# Patient Record
Sex: FEMALE | URBAN - METROPOLITAN AREA
[De-identification: names, ages, dates, MRNs, and addresses within clinical notes are randomized per-mention and may not be internally consistent; named-entity substitution may affect disease eponyms.]

---

## 2017-04-26 ENCOUNTER — OUTPATIENT (OUTPATIENT)
Dept: OUTPATIENT SERVICES | Facility: HOSPITAL | Age: 27
LOS: 1 days | End: 2017-04-26
Payer: COMMERCIAL

## 2017-04-26 PROCEDURE — 99214 OFFICE O/P EST MOD 30 MIN: CPT

## 2017-05-01 ENCOUNTER — OUTPATIENT (OUTPATIENT)
Dept: OUTPATIENT SERVICES | Facility: HOSPITAL | Age: 27
LOS: 1 days | End: 2017-05-01
Payer: COMMERCIAL

## 2017-05-01 LAB
ALBUMIN SERPL ELPH-MCNC: 2.7 G/DL — LOW (ref 3.4–5)
ALP SERPL-CCNC: 188 U/L — HIGH (ref 40–120)
ALT FLD-CCNC: 26 U/L — SIGNIFICANT CHANGE UP (ref 12–42)
ANION GAP SERPL CALC-SCNC: 8 MMOL/L — LOW (ref 9–16)
APPEARANCE UR: CLEAR — SIGNIFICANT CHANGE UP
APTT BLD: 27.1 SEC — LOW (ref 27.5–37.4)
AST SERPL-CCNC: 27 U/L — SIGNIFICANT CHANGE UP (ref 15–37)
BASOPHILS NFR BLD AUTO: 0.1 % — SIGNIFICANT CHANGE UP (ref 0–2)
BILIRUB SERPL-MCNC: 0.3 MG/DL — SIGNIFICANT CHANGE UP (ref 0.2–1.2)
BILIRUB UR-MCNC: NEGATIVE — SIGNIFICANT CHANGE UP
BUN SERPL-MCNC: 8 MG/DL — SIGNIFICANT CHANGE UP (ref 7–23)
CALCIUM SERPL-MCNC: 9 MG/DL — SIGNIFICANT CHANGE UP (ref 8.5–10.5)
CHLORIDE SERPL-SCNC: 105 MMOL/L — SIGNIFICANT CHANGE UP (ref 96–108)
CO2 SERPL-SCNC: 24 MMOL/L — SIGNIFICANT CHANGE UP (ref 22–31)
COLOR SPEC: YELLOW — SIGNIFICANT CHANGE UP
CREAT SERPL-MCNC: 0.43 MG/DL — LOW (ref 0.5–1.3)
DIFF PNL FLD: (no result)
EOSINOPHIL NFR BLD AUTO: 0.2 % — SIGNIFICANT CHANGE UP (ref 0–6)
FIBRINOGEN PPP-MCNC: 577 MG/DL — HIGH (ref 258–438)
GLUCOSE SERPL-MCNC: 104 MG/DL — HIGH (ref 70–99)
GLUCOSE UR QL: NEGATIVE — SIGNIFICANT CHANGE UP
HCT VFR BLD CALC: 33.1 % — LOW (ref 34.5–45)
HGB BLD-MCNC: 11.4 G/DL — LOW (ref 11.5–15.5)
INR BLD: 0.92 — SIGNIFICANT CHANGE UP (ref 0.88–1.16)
KETONES UR-MCNC: NEGATIVE — SIGNIFICANT CHANGE UP
LDH SERPL L TO P-CCNC: 260 U/L — HIGH (ref 84–246)
LEUKOCYTE ESTERASE UR-ACNC: (no result)
LYMPHOCYTES # BLD AUTO: 12.7 % — LOW (ref 13–44)
MCHC RBC-ENTMCNC: 27.2 PG — SIGNIFICANT CHANGE UP (ref 27–34)
MCHC RBC-ENTMCNC: 34.4 G/DL — SIGNIFICANT CHANGE UP (ref 32–36)
MCV RBC AUTO: 79 FL — LOW (ref 80–100)
MONOCYTES NFR BLD AUTO: 7.8 % — SIGNIFICANT CHANGE UP (ref 2–14)
NEUTROPHILS NFR BLD AUTO: 79.2 % — HIGH (ref 43–77)
NITRITE UR-MCNC: NEGATIVE — SIGNIFICANT CHANGE UP
PH UR: 6.5 — SIGNIFICANT CHANGE UP (ref 5–8)
PLATELET # BLD AUTO: 257 K/UL — SIGNIFICANT CHANGE UP (ref 150–400)
POTASSIUM SERPL-MCNC: 3.6 MMOL/L — SIGNIFICANT CHANGE UP (ref 3.5–5.3)
POTASSIUM SERPL-SCNC: 3.6 MMOL/L — SIGNIFICANT CHANGE UP (ref 3.5–5.3)
PROT SERPL-MCNC: 6.9 G/DL — SIGNIFICANT CHANGE UP (ref 6.4–8.2)
PROT UR-MCNC: NEGATIVE MG/DL — SIGNIFICANT CHANGE UP
PROTHROM AB SERPL-ACNC: 10.2 SEC — SIGNIFICANT CHANGE UP (ref 9.8–12.7)
RBC # BLD: 4.19 M/UL — SIGNIFICANT CHANGE UP (ref 3.8–5.2)
RBC # FLD: 16.6 % — SIGNIFICANT CHANGE UP (ref 10.3–16.9)
SODIUM SERPL-SCNC: 137 MMOL/L — SIGNIFICANT CHANGE UP (ref 135–145)
SP GR SPEC: <=1.005 — SIGNIFICANT CHANGE UP (ref 1–1.03)
URATE SERPL-MCNC: 4 MG/DL — SIGNIFICANT CHANGE UP (ref 2.6–6)
UROBILINOGEN FLD QL: 0.2 E.U./DL — SIGNIFICANT CHANGE UP
WBC # BLD: 8.2 K/UL — SIGNIFICANT CHANGE UP (ref 3.8–10.5)
WBC # FLD AUTO: 8.2 K/UL — SIGNIFICANT CHANGE UP (ref 3.8–10.5)

## 2017-05-01 PROCEDURE — 85025 COMPLETE CBC W/AUTO DIFF WBC: CPT

## 2017-05-01 PROCEDURE — 85610 PROTHROMBIN TIME: CPT

## 2017-05-01 PROCEDURE — 84550 ASSAY OF BLOOD/URIC ACID: CPT

## 2017-05-01 PROCEDURE — 85384 FIBRINOGEN ACTIVITY: CPT

## 2017-05-01 PROCEDURE — 36415 COLL VENOUS BLD VENIPUNCTURE: CPT

## 2017-05-01 PROCEDURE — 81001 URINALYSIS AUTO W/SCOPE: CPT

## 2017-05-01 PROCEDURE — 83615 LACTATE (LD) (LDH) ENZYME: CPT

## 2017-05-01 PROCEDURE — 80053 COMPREHEN METABOLIC PANEL: CPT

## 2017-05-01 PROCEDURE — 85730 THROMBOPLASTIN TIME PARTIAL: CPT

## 2017-05-01 PROCEDURE — 99214 OFFICE O/P EST MOD 30 MIN: CPT

## 2017-05-03 DIAGNOSIS — O09.613 SUPERVISION OF YOUNG PRIMIGRAVIDA, THIRD TRIMESTER: ICD-10-CM

## 2017-05-05 ENCOUNTER — INPATIENT (INPATIENT)
Facility: HOSPITAL | Age: 27
LOS: 2 days | Discharge: HOME | End: 2017-05-08
Attending: OBSTETRICS & GYNECOLOGY | Admitting: OBSTETRICS & GYNECOLOGY
Payer: COMMERCIAL

## 2017-05-05 VITALS — WEIGHT: 169.76 LBS | HEIGHT: 67 IN

## 2017-05-05 DIAGNOSIS — O26.899 OTHER SPECIFIED PREGNANCY RELATED CONDITIONS, UNSPECIFIED TRIMESTER: ICD-10-CM

## 2017-05-05 DIAGNOSIS — Z3A.00 WEEKS OF GESTATION OF PREGNANCY NOT SPECIFIED: ICD-10-CM

## 2017-05-05 LAB
ALBUMIN SERPL ELPH-MCNC: 2.9 G/DL — LOW (ref 3.4–5)
ALP SERPL-CCNC: 198 U/L — HIGH (ref 40–120)
ALT FLD-CCNC: 28 U/L — SIGNIFICANT CHANGE UP (ref 12–42)
ANION GAP SERPL CALC-SCNC: 11 MMOL/L — SIGNIFICANT CHANGE UP (ref 9–16)
APPEARANCE UR: CLEAR — SIGNIFICANT CHANGE UP
APTT BLD: 27.2 SEC — LOW (ref 27.5–37.4)
AST SERPL-CCNC: 30 U/L — SIGNIFICANT CHANGE UP (ref 15–37)
BASOPHILS NFR BLD AUTO: 0.1 % — SIGNIFICANT CHANGE UP (ref 0–2)
BILIRUB SERPL-MCNC: 0.3 MG/DL — SIGNIFICANT CHANGE UP (ref 0.2–1.2)
BILIRUB UR-MCNC: NEGATIVE — SIGNIFICANT CHANGE UP
BLD GP AB SCN SERPL QL: NEGATIVE — SIGNIFICANT CHANGE UP
BLD GP AB SCN SERPL QL: NEGATIVE — SIGNIFICANT CHANGE UP
BUN SERPL-MCNC: 6 MG/DL — LOW (ref 7–23)
CALCIUM SERPL-MCNC: 9.1 MG/DL — SIGNIFICANT CHANGE UP (ref 8.5–10.5)
CHLORIDE SERPL-SCNC: 103 MMOL/L — SIGNIFICANT CHANGE UP (ref 96–108)
CO2 SERPL-SCNC: 22 MMOL/L — SIGNIFICANT CHANGE UP (ref 22–31)
COLOR SPEC: YELLOW — SIGNIFICANT CHANGE UP
CREAT SERPL-MCNC: 0.43 MG/DL — LOW (ref 0.5–1.3)
DIFF PNL FLD: (no result)
EOSINOPHIL NFR BLD AUTO: 0.2 % — SIGNIFICANT CHANGE UP (ref 0–6)
FIBRINOGEN PPP-MCNC: 536 MG/DL — HIGH (ref 258–438)
GLUCOSE SERPL-MCNC: 71 MG/DL — SIGNIFICANT CHANGE UP (ref 70–99)
GLUCOSE UR QL: NEGATIVE — SIGNIFICANT CHANGE UP
HCT VFR BLD CALC: 35 % — SIGNIFICANT CHANGE UP (ref 34.5–45)
HGB BLD-MCNC: 12 G/DL — SIGNIFICANT CHANGE UP (ref 11.5–15.5)
INR BLD: 0.91 — SIGNIFICANT CHANGE UP (ref 0.88–1.16)
KETONES UR-MCNC: (no result) MG/DL
LDH SERPL L TO P-CCNC: 250 U/L — HIGH (ref 84–246)
LEUKOCYTE ESTERASE UR-ACNC: (no result)
LYMPHOCYTES # BLD AUTO: 16.6 % — SIGNIFICANT CHANGE UP (ref 13–44)
MCHC RBC-ENTMCNC: 27.3 PG — SIGNIFICANT CHANGE UP (ref 27–34)
MCHC RBC-ENTMCNC: 34.3 G/DL — SIGNIFICANT CHANGE UP (ref 32–36)
MCV RBC AUTO: 79.5 FL — LOW (ref 80–100)
MONOCYTES NFR BLD AUTO: 7.1 % — SIGNIFICANT CHANGE UP (ref 2–14)
NEUTROPHILS NFR BLD AUTO: 76 % — SIGNIFICANT CHANGE UP (ref 43–77)
NITRITE UR-MCNC: NEGATIVE — SIGNIFICANT CHANGE UP
PH UR: 7 — SIGNIFICANT CHANGE UP (ref 5–8)
PLATELET # BLD AUTO: 245 K/UL — SIGNIFICANT CHANGE UP (ref 150–400)
POTASSIUM SERPL-MCNC: 3.8 MMOL/L — SIGNIFICANT CHANGE UP (ref 3.5–5.3)
POTASSIUM SERPL-SCNC: 3.8 MMOL/L — SIGNIFICANT CHANGE UP (ref 3.5–5.3)
PROT SERPL-MCNC: 6.9 G/DL — SIGNIFICANT CHANGE UP (ref 6.4–8.2)
PROT UR-MCNC: NEGATIVE MG/DL — SIGNIFICANT CHANGE UP
PROTHROM AB SERPL-ACNC: 10.1 SEC — SIGNIFICANT CHANGE UP (ref 9.8–12.7)
RBC # BLD: 4.4 M/UL — SIGNIFICANT CHANGE UP (ref 3.8–5.2)
RBC # FLD: 17.5 % — HIGH (ref 10.3–16.9)
RH IG SCN BLD-IMP: POSITIVE — SIGNIFICANT CHANGE UP
RH IG SCN BLD-IMP: POSITIVE — SIGNIFICANT CHANGE UP
SODIUM SERPL-SCNC: 136 MMOL/L — SIGNIFICANT CHANGE UP (ref 135–145)
SP GR SPEC: 1.01 — SIGNIFICANT CHANGE UP (ref 1–1.03)
URATE SERPL-MCNC: 4.1 MG/DL — SIGNIFICANT CHANGE UP (ref 2.6–6)
UROBILINOGEN FLD QL: 0.2 E.U./DL — SIGNIFICANT CHANGE UP
WBC # BLD: 8.1 K/UL — SIGNIFICANT CHANGE UP (ref 3.8–10.5)
WBC # FLD AUTO: 8.1 K/UL — SIGNIFICANT CHANGE UP (ref 3.8–10.5)

## 2017-05-05 RX ORDER — SODIUM CHLORIDE 9 MG/ML
1000 INJECTION, SOLUTION INTRAVENOUS ONCE
Refills: 0 | Status: DISCONTINUED | OUTPATIENT
Start: 2017-05-05 | End: 2017-05-06

## 2017-05-05 RX ORDER — OXYTOCIN 10 UNIT/ML
1 VIAL (ML) INJECTION
Qty: 30 | Refills: 0 | Status: DISCONTINUED | OUTPATIENT
Start: 2017-05-05 | End: 2017-05-06

## 2017-05-05 RX ORDER — OXYTOCIN 10 UNIT/ML
333.33 VIAL (ML) INJECTION
Qty: 20 | Refills: 0 | Status: DISCONTINUED | OUTPATIENT
Start: 2017-05-05 | End: 2017-05-06

## 2017-05-05 RX ORDER — CITRIC ACID/SODIUM CITRATE 300-500 MG
15 SOLUTION, ORAL ORAL EVERY 4 HOURS
Refills: 0 | Status: DISCONTINUED | OUTPATIENT
Start: 2017-05-05 | End: 2017-05-06

## 2017-05-05 RX ORDER — INFLUENZA VIRUS VACCINE 15; 15; 15; 15 UG/.5ML; UG/.5ML; UG/.5ML; UG/.5ML
0.5 SUSPENSION INTRAMUSCULAR ONCE
Refills: 0 | Status: DISCONTINUED | OUTPATIENT
Start: 2017-05-05 | End: 2017-05-06

## 2017-05-05 RX ORDER — SODIUM CHLORIDE 9 MG/ML
1000 INJECTION, SOLUTION INTRAVENOUS
Refills: 0 | Status: DISCONTINUED | OUTPATIENT
Start: 2017-05-05 | End: 2017-05-06

## 2017-05-05 RX ADMIN — SODIUM CHLORIDE 125 MILLILITER(S): 9 INJECTION, SOLUTION INTRAVENOUS at 18:10

## 2017-05-05 RX ADMIN — Medication 1 MILLIUNIT(S)/MIN: at 19:31

## 2017-05-06 LAB
APTT BLD: 25.8 SEC — LOW (ref 27.5–37.4)
FIBRINOGEN PPP-MCNC: 375 MG/DL — SIGNIFICANT CHANGE UP (ref 258–438)
HCT VFR BLD CALC: 27.3 % — LOW (ref 34.5–45)
HGB BLD-MCNC: 9.2 G/DL — LOW (ref 11.5–15.5)
INR BLD: 0.88 — SIGNIFICANT CHANGE UP (ref 0.88–1.16)
MCHC RBC-ENTMCNC: 26.9 PG — LOW (ref 27–34)
MCHC RBC-ENTMCNC: 33.7 G/DL — SIGNIFICANT CHANGE UP (ref 32–36)
MCV RBC AUTO: 79.8 FL — LOW (ref 80–100)
PLATELET # BLD AUTO: 196 K/UL — SIGNIFICANT CHANGE UP (ref 150–400)
PROTHROM AB SERPL-ACNC: 9.7 SEC — LOW (ref 9.8–12.7)
RBC # BLD: 3.42 M/UL — LOW (ref 3.8–5.2)
RBC # FLD: 17.6 % — HIGH (ref 10.3–16.9)
WBC # BLD: 8.6 K/UL — SIGNIFICANT CHANGE UP (ref 3.8–10.5)
WBC # FLD AUTO: 8.6 K/UL — SIGNIFICANT CHANGE UP (ref 3.8–10.5)

## 2017-05-06 RX ORDER — ACETAMINOPHEN 500 MG
650 TABLET ORAL EVERY 6 HOURS
Refills: 0 | Status: DISCONTINUED | OUTPATIENT
Start: 2017-05-06 | End: 2017-05-08

## 2017-05-06 RX ORDER — PRAMOXINE HYDROCHLORIDE 150 MG/15G
1 AEROSOL, FOAM RECTAL EVERY 4 HOURS
Refills: 0 | Status: DISCONTINUED | OUTPATIENT
Start: 2017-05-06 | End: 2017-05-08

## 2017-05-06 RX ORDER — TETANUS TOXOID, REDUCED DIPHTHERIA TOXOID AND ACELLULAR PERTUSSIS VACCINE, ADSORBED 5; 2.5; 8; 8; 2.5 [IU]/.5ML; [IU]/.5ML; UG/.5ML; UG/.5ML; UG/.5ML
0.5 SUSPENSION INTRAMUSCULAR ONCE
Refills: 0 | Status: DISCONTINUED | OUTPATIENT
Start: 2017-05-06 | End: 2017-05-08

## 2017-05-06 RX ORDER — GLYCERIN ADULT
1 SUPPOSITORY, RECTAL RECTAL AT BEDTIME
Refills: 0 | Status: DISCONTINUED | OUTPATIENT
Start: 2017-05-06 | End: 2017-05-08

## 2017-05-06 RX ORDER — DIBUCAINE 1 %
1 OINTMENT (GRAM) RECTAL EVERY 4 HOURS
Refills: 0 | Status: DISCONTINUED | OUTPATIENT
Start: 2017-05-06 | End: 2017-05-08

## 2017-05-06 RX ORDER — HYDROCORTISONE 1 %
1 OINTMENT (GRAM) TOPICAL EVERY 4 HOURS
Refills: 0 | Status: DISCONTINUED | OUTPATIENT
Start: 2017-05-06 | End: 2017-05-08

## 2017-05-06 RX ORDER — DOCUSATE SODIUM 100 MG
100 CAPSULE ORAL
Refills: 0 | Status: DISCONTINUED | OUTPATIENT
Start: 2017-05-06 | End: 2017-05-08

## 2017-05-06 RX ORDER — BENZOCAINE 10 %
1 GEL (GRAM) MUCOUS MEMBRANE EVERY 6 HOURS
Refills: 0 | Status: DISCONTINUED | OUTPATIENT
Start: 2017-05-06 | End: 2017-05-08

## 2017-05-06 RX ORDER — OXYTOCIN 10 UNIT/ML
41.67 VIAL (ML) INJECTION
Qty: 20 | Refills: 0 | Status: DISCONTINUED | OUTPATIENT
Start: 2017-05-06 | End: 2017-05-08

## 2017-05-06 RX ORDER — IBUPROFEN 200 MG
600 TABLET ORAL EVERY 6 HOURS
Refills: 0 | Status: DISCONTINUED | OUTPATIENT
Start: 2017-05-06 | End: 2017-05-08

## 2017-05-06 RX ORDER — SODIUM CHLORIDE 9 MG/ML
3 INJECTION INTRAMUSCULAR; INTRAVENOUS; SUBCUTANEOUS EVERY 8 HOURS
Refills: 0 | Status: DISCONTINUED | OUTPATIENT
Start: 2017-05-06 | End: 2017-05-08

## 2017-05-06 RX ORDER — MAGNESIUM HYDROXIDE 400 MG/1
30 TABLET, CHEWABLE ORAL
Refills: 0 | Status: DISCONTINUED | OUTPATIENT
Start: 2017-05-06 | End: 2017-05-08

## 2017-05-06 RX ORDER — AER TRAVELER 0.5 G/1
1 SOLUTION RECTAL; TOPICAL EVERY 4 HOURS
Refills: 0 | Status: DISCONTINUED | OUTPATIENT
Start: 2017-05-06 | End: 2017-05-08

## 2017-05-06 RX ORDER — SIMETHICONE 80 MG/1
80 TABLET, CHEWABLE ORAL EVERY 6 HOURS
Refills: 0 | Status: DISCONTINUED | OUTPATIENT
Start: 2017-05-06 | End: 2017-05-08

## 2017-05-06 RX ORDER — DIPHENHYDRAMINE HCL 50 MG
25 CAPSULE ORAL EVERY 6 HOURS
Refills: 0 | Status: DISCONTINUED | OUTPATIENT
Start: 2017-05-06 | End: 2017-05-08

## 2017-05-06 RX ORDER — LANOLIN
1 OINTMENT (GRAM) TOPICAL EVERY 6 HOURS
Refills: 0 | Status: DISCONTINUED | OUTPATIENT
Start: 2017-05-06 | End: 2017-05-08

## 2017-05-06 RX ADMIN — Medication 600 MILLIGRAM(S): at 20:41

## 2017-05-06 RX ADMIN — Medication 600 MILLIGRAM(S): at 10:22

## 2017-05-06 RX ADMIN — Medication 1 TABLET(S): at 12:13

## 2017-05-06 RX ADMIN — SODIUM CHLORIDE 3 MILLILITER(S): 9 INJECTION INTRAMUSCULAR; INTRAVENOUS; SUBCUTANEOUS at 18:15

## 2017-05-06 RX ADMIN — Medication 600 MILLIGRAM(S): at 23:04

## 2017-05-06 RX ADMIN — Medication 125 MILLIUNIT(S)/MIN: at 18:16

## 2017-05-06 RX ADMIN — Medication 600 MILLIGRAM(S): at 22:23

## 2017-05-06 RX ADMIN — Medication 125 MILLIUNIT(S)/MIN: at 04:53

## 2017-05-06 RX ADMIN — Medication 100 MILLIGRAM(S): at 12:13

## 2017-05-06 RX ADMIN — Medication 600 MILLIGRAM(S): at 11:22

## 2017-05-06 RX ADMIN — Medication 600 MILLIGRAM(S): at 18:13

## 2017-05-06 RX ADMIN — SODIUM CHLORIDE 3 MILLILITER(S): 9 INJECTION INTRAMUSCULAR; INTRAVENOUS; SUBCUTANEOUS at 22:24

## 2017-05-07 LAB — T PALLIDUM AB TITR SER: NEGATIVE — SIGNIFICANT CHANGE UP

## 2017-05-07 RX ADMIN — Medication 600 MILLIGRAM(S): at 10:19

## 2017-05-07 RX ADMIN — SODIUM CHLORIDE 3 MILLILITER(S): 9 INJECTION INTRAMUSCULAR; INTRAVENOUS; SUBCUTANEOUS at 09:20

## 2017-05-07 RX ADMIN — Medication 100 MILLIGRAM(S): at 12:59

## 2017-05-07 RX ADMIN — SODIUM CHLORIDE 3 MILLILITER(S): 9 INJECTION INTRAMUSCULAR; INTRAVENOUS; SUBCUTANEOUS at 23:18

## 2017-05-07 RX ADMIN — Medication 1 TABLET(S): at 12:59

## 2017-05-07 RX ADMIN — Medication 600 MILLIGRAM(S): at 17:56

## 2017-05-07 RX ADMIN — Medication 600 MILLIGRAM(S): at 23:20

## 2017-05-07 RX ADMIN — Medication 600 MILLIGRAM(S): at 18:25

## 2017-05-07 RX ADMIN — Medication 600 MILLIGRAM(S): at 09:20

## 2017-05-07 RX ADMIN — SODIUM CHLORIDE 3 MILLILITER(S): 9 INJECTION INTRAMUSCULAR; INTRAVENOUS; SUBCUTANEOUS at 06:27

## 2017-05-07 NOTE — DISCHARGE NOTE OB - PATIENT PORTAL LINK FT
“You can access the [facility] Patient Portal, offered by Health system, by registering with the following website: [email or web address]”

## 2017-05-07 NOTE — DISCHARGE NOTE OB - CARE PROVIDER_API CALL
Gabrielle Kaur (), Obstetrics and Gynecology  [address] [place] [id number]  Phone: [phone]  Fax: [phone]

## 2017-05-07 NOTE — DISCHARGE NOTE OB - CARE PLAN
Principal Discharge DX:	Postpartum examination following vaginal delivery  Goal:	postpartum  Instructions for follow-up, activity and diet:	pelvic rest, no heavy lifting, follow up in the office in 2 weeks

## 2017-05-08 VITALS
TEMPERATURE: 98 F | RESPIRATION RATE: 16 BRPM | SYSTOLIC BLOOD PRESSURE: 115 MMHG | HEART RATE: 82 BPM | DIASTOLIC BLOOD PRESSURE: 69 MMHG | OXYGEN SATURATION: 100 %

## 2017-05-08 PROCEDURE — 84550 ASSAY OF BLOOD/URIC ACID: CPT

## 2017-05-08 PROCEDURE — 83615 LACTATE (LD) (LDH) ENZYME: CPT

## 2017-05-08 PROCEDURE — 86900 BLOOD TYPING SEROLOGIC ABO: CPT

## 2017-05-08 PROCEDURE — 86850 RBC ANTIBODY SCREEN: CPT

## 2017-05-08 PROCEDURE — 85025 COMPLETE CBC W/AUTO DIFF WBC: CPT

## 2017-05-08 PROCEDURE — 81001 URINALYSIS AUTO W/SCOPE: CPT

## 2017-05-08 PROCEDURE — 88307 TISSUE EXAM BY PATHOLOGIST: CPT

## 2017-05-08 PROCEDURE — 85730 THROMBOPLASTIN TIME PARTIAL: CPT

## 2017-05-08 PROCEDURE — 85610 PROTHROMBIN TIME: CPT

## 2017-05-08 PROCEDURE — 85027 COMPLETE CBC AUTOMATED: CPT

## 2017-05-08 PROCEDURE — 80053 COMPREHEN METABOLIC PANEL: CPT

## 2017-05-08 PROCEDURE — 36415 COLL VENOUS BLD VENIPUNCTURE: CPT

## 2017-05-08 PROCEDURE — 86780 TREPONEMA PALLIDUM: CPT

## 2017-05-08 PROCEDURE — 86901 BLOOD TYPING SEROLOGIC RH(D): CPT

## 2017-05-08 PROCEDURE — 85384 FIBRINOGEN ACTIVITY: CPT

## 2017-05-08 RX ADMIN — Medication 600 MILLIGRAM(S): at 07:30

## 2017-05-08 RX ADMIN — Medication 600 MILLIGRAM(S): at 14:30

## 2017-05-08 RX ADMIN — Medication 1 TABLET(S): at 12:44

## 2017-05-08 RX ADMIN — SODIUM CHLORIDE 3 MILLILITER(S): 9 INJECTION INTRAMUSCULAR; INTRAVENOUS; SUBCUTANEOUS at 06:34

## 2017-05-08 RX ADMIN — Medication 600 MILLIGRAM(S): at 06:38

## 2017-05-08 RX ADMIN — Medication 600 MILLIGRAM(S): at 13:24

## 2017-05-08 RX ADMIN — Medication 600 MILLIGRAM(S): at 00:06

## 2017-05-08 NOTE — PROGRESS NOTE ADULT - ASSESSMENT
A/P yo 26y s/p , PPD #2, c/b PPH, stable  1. PPH: patient is asymptomatic, vitals stable,  lochia is minimal, hgb stable at 9.2, s/p cytotec 1000 mcg ME and an additional bag of pitocin. Continue to monitor lochia and VS.   2. Pain: Motrin prn  3. GI: Reg diet  4. Encourage breastfeeding  5. DVT prophylaxis: early ambulation  6. Dispo: PP2
25yo s/p vaginal delivery, now complicated by PPH. Pt s/p Cytotec 1000mcg per rectum and is currently on pitocin. Uterus firm with no active vaginal bleeding. Pt asymptomatic. Discussed with Dr. Kaur. No further CBC as of now unless clinically indicated. Continue to monitor.
A/P yo 26y s/p , PPD #1, c/b PPH, stable  1. PPH: patient is asymptomatic, lochia is minimal this morning, hgb stable at 9.2, s/p cytotec 1000 mcg DE and an additional bag of pitocin. Continue to monitor lochia and VS.   2. Pain: Motrin prn  3. GI: Reg diet  4. Encourage breastfeeding  5. DVT prophylaxis: early ambulation  6. Dispo: PP2

## 2017-05-08 NOTE — PROGRESS NOTE ADULT - SUBJECTIVE AND OBJECTIVE BOX
Patient seen and evaluated. No complaints. Minimal bleeding  ICU Vital Signs Last 24 Hrs  T(C): 36.4, Max: 37.6 (05-06 @ 20:06)  T(F): 97.6, Max: 99.7 (05-06 @ 20:06)  HR: 96 (74 - 124)  BP: 114/71 (103/62 - 140/85)  BP(mean): 91 (91 - 91)  ABP: --  ABP(mean): --  RR: 17 (16 - 18)  SpO2: 100% (96% - 100%)    Abd uterus firm, below the umbilicus  Ext no calf tenderness  Lochia minimal    Plan PP day 1, s/p episode of heavy bleeding yesterday  regular diet  ambulation encouraged  monitor bleeding
Patient evaluated at bedside.   She reports pain is well controlled with OPM.  She has been ambulating without assistance, voiding spontaneously, passing gas, tolerating regular diet and is breastfeeding.    She denies HA, dizziness, CP, palpitations, SOB, n/v, or heavy vaginal bleeding.    Physical Exam:  vss  Gen: NAD  Abd: + BS, soft, nontender, nondistended, no rebound or guarding  uterus firm at midline,   fb below umbilicus  : lochia WNL  Extremities: no swelling or calf tenderness    ppd2 stable  plan to dc
Patient evaluated at bedside.   She reports pain is well controlled with motrin.  She denies dizziness, chest pain, palpitations, shortness of breathe, nausea, vomiting, heavy vaginal bleeding or perineal discomfort.  She has been ambulating without assistance, voiding spontaneously, and is breastfeeding.    Physical Exam:  Vital Signs Last 24 Hrs  T(C): 36.7, Max: 37.6 (05-06 @ 20:06)  T(F): 98.1, Max: 99.7 (05-06 @ 20:06)  HR: 88 (69 - 124)  BP: 119/76 (103/62 - 140/85)  BP(mean): 91 (91 - 91)  RR: 17 (16 - 18)  SpO2: 100% (96% - 100%)    GA: NAD, A+0 x 3  CV: RRR  Pulm: CTAB  Breasts: soft, nontender, no palpable masses  Abd: + BS, soft, nontender, nondistended, no rebound or guarding, uterus firm at midline,  2 fb below umbilicus  : lochia minimal  Extremities: no swelling or calf tenderness, reflexes +2 bilaterally                          9.2    8.6   )-----------( 196      ( 06 May 2017 16:53 )             27.3     05-05    136  |  103  |  6<L>  ----------------------------<  71  3.8   |  22  |  0.43<L>    Ca    9.1      05 May 2017 18:36    TPro  6.9  /  Alb  2.9<L>  /  TBili  0.3  /  DBili  x   /  AST  30  /  ALT  28  /  AlkPhos  198<H>  05-05    PT/INR - ( 06 May 2017 16:53 )   PT: 9.7 sec;   INR: 0.88          PTT - ( 06 May 2017 16:53 )  PTT:25.8 sec
Patient re-evaluated at bedside. She notes that she is feeling well. Denies heavy vaginal bleeding or clots. Noted a small amount of bleeding earlier. Denies F/C, N/V, CP, SOB, Abd pain.      Physical Exam:  Vital Signs Last 24 Hrs  T(C): 36.7, Max: 37.1 (05-06 @ 06:05)  T(F): 98.1, Max: 98.8 (05-06 @ 06:05)  HR: 92 (69 - 121)  BP: 114/77 (105/69 - 140/85)  BP(mean): --  RR: 18 (18 - 20)  SpO2: 98% (96% - 100%)    GA: NAD, A+0 x 3  Abd: + BS, soft, nontender, nondistended, no rebound or guarding uterus firm at midline, 2 fb below umbilicus  : lochia WNL  Extremities: no swelling or calf tenderness                            9.2    8.6   )-----------( 196      ( 06 May 2017 16:53 )             27.3     05-05    136  |  103  |  6<L>  ----------------------------<  71  3.8   |  22  |  0.43<L>    Ca    9.1      05 May 2017 18:36    TPro  6.9  /  Alb  2.9<L>  /  TBili  0.3  /  DBili  x   /  AST  30  /  ALT  28  /  AlkPhos  198<H>  05-05      PT/INR - ( 06 May 2017 16:53 )   PT: 9.7 sec;   INR: 0.88          PTT - ( 06 May 2017 16:53 )  PTT:25.8 sec
Patient seen at bedside for vaginal bleeding. Pt denies light headedness/dizziness or SOB. Upon evaluation, underwear and pad was soaked through. Bimanual exam performed. 600cc of dark clots expressed. fundus of uterus feels firm and at midline below the umbilicus. 1000mcg [place] cytotec given. blood pressure remains tstable at 120/70. HR 100s-110s. Pt reports she is very anxious. STAT CBC and Coags ordered. Attending made aware    Plan:   -f/u vital signs  -f/u CBC and coags  -Patient instructed to remain in bed and to notify nurse of any symptoms of lightheadedness, dizziness, or SOB.
Patient evaluated at bedside.   She reports pain is well controlled with motrin. Lochia is minimal. She passed a small clot yesterday, but otherwise bleeding has been lighter than menses.  She denies headache, dizziness, chest pain, palpitations, shortness of breathe, nausea, vomiting, perineal discomfort.  She has been ambulating without assistance, voiding spontaneously, and is breastfeeding.    Physical Exam:  Vital Signs Last 24 Hrs  T(C): 36.8, Max: 36.8 (05-07 @ 09:10)  T(F): 98.2, Max: 98.2 (05-07 @ 09:10)  HR: 90 (78 - 96)  BP: 114/72 (113/79 - 129/86)  BP(mean): --  RR: 18 (17 - 18)  SpO2: 100% (100% - 100%)    GA: NAD, A+0 x 3  CV: RRR  Pulm: CTAB  Breasts: soft, nontender, no palpable masses  Abd: + BS, soft, nontender, nondistended, no rebound or guarding, uterus firm at midline,  1 fb below umbilicus  : lochia WNL  Extremities: no swelling or calf tenderness, reflexes +2 bilaterally                          9.2    8.6   )-----------( 196      ( 06 May 2017 16:53 )             27.3           PT/INR - ( 06 May 2017 16:53 )   PT: 9.7 sec;   INR: 0.88          PTT - ( 06 May 2017 16:53 )  PTT:25.8 sec

## 2017-05-09 LAB — SURGICAL PATHOLOGY STUDY: SIGNIFICANT CHANGE UP

## 2017-05-11 DIAGNOSIS — Z3A.40 40 WEEKS GESTATION OF PREGNANCY: ICD-10-CM

## 2018-11-16 ENCOUNTER — RESULT REVIEW (OUTPATIENT)
Age: 28
End: 2018-11-16

## 2018-12-11 NOTE — PATIENT PROFILE OB - PRENATAL INFORMATION COMMENT, OB PROFILE
I have obtained patient's history, performed physical exam and formulated management plan.   Sina Zheng
AVAILABLE

## 2019-07-11 ENCOUNTER — EMERGENCY (EMERGENCY)
Facility: HOSPITAL | Age: 29
LOS: 1 days | Discharge: HOME | End: 2019-07-11
Attending: EMERGENCY MEDICINE | Admitting: EMERGENCY MEDICINE
Payer: COMMERCIAL

## 2019-07-11 VITALS
OXYGEN SATURATION: 99 % | TEMPERATURE: 99 F | HEART RATE: 87 BPM | SYSTOLIC BLOOD PRESSURE: 123 MMHG | RESPIRATION RATE: 18 BRPM | DIASTOLIC BLOOD PRESSURE: 77 MMHG

## 2019-07-11 VITALS
SYSTOLIC BLOOD PRESSURE: 142 MMHG | HEART RATE: 104 BPM | WEIGHT: 137.79 LBS | OXYGEN SATURATION: 99 % | TEMPERATURE: 98 F | HEIGHT: 67 IN | DIASTOLIC BLOOD PRESSURE: 96 MMHG | RESPIRATION RATE: 18 BRPM

## 2019-07-11 DIAGNOSIS — Z88.1 ALLERGY STATUS TO OTHER ANTIBIOTIC AGENTS STATUS: ICD-10-CM

## 2019-07-11 DIAGNOSIS — N20.0 CALCULUS OF KIDNEY: ICD-10-CM

## 2019-07-11 DIAGNOSIS — R10.30 LOWER ABDOMINAL PAIN, UNSPECIFIED: ICD-10-CM

## 2019-07-11 LAB
ALBUMIN SERPL ELPH-MCNC: 4.5 G/DL — SIGNIFICANT CHANGE UP (ref 3.3–5)
ALP SERPL-CCNC: 113 U/L — SIGNIFICANT CHANGE UP (ref 40–120)
ALT FLD-CCNC: 13 U/L — SIGNIFICANT CHANGE UP (ref 10–45)
ANION GAP SERPL CALC-SCNC: 10 MMOL/L — SIGNIFICANT CHANGE UP (ref 5–17)
APPEARANCE UR: CLEAR — SIGNIFICANT CHANGE UP
AST SERPL-CCNC: 19 U/L — SIGNIFICANT CHANGE UP (ref 10–40)
BASOPHILS # BLD AUTO: 0.03 K/UL — SIGNIFICANT CHANGE UP (ref 0–0.2)
BASOPHILS NFR BLD AUTO: 0.4 % — SIGNIFICANT CHANGE UP (ref 0–2)
BILIRUB SERPL-MCNC: 0.2 MG/DL — SIGNIFICANT CHANGE UP (ref 0.2–1.2)
BILIRUB UR-MCNC: NEGATIVE — SIGNIFICANT CHANGE UP
BUN SERPL-MCNC: 9 MG/DL — SIGNIFICANT CHANGE UP (ref 7–23)
CALCIUM SERPL-MCNC: 9.7 MG/DL — SIGNIFICANT CHANGE UP (ref 8.4–10.5)
CHLORIDE SERPL-SCNC: 106 MMOL/L — SIGNIFICANT CHANGE UP (ref 96–108)
CO2 SERPL-SCNC: 25 MMOL/L — SIGNIFICANT CHANGE UP (ref 22–31)
COLOR SPEC: YELLOW — SIGNIFICANT CHANGE UP
CREAT SERPL-MCNC: 0.63 MG/DL — SIGNIFICANT CHANGE UP (ref 0.5–1.3)
DIFF PNL FLD: ABNORMAL
EOSINOPHIL # BLD AUTO: 0.08 K/UL — SIGNIFICANT CHANGE UP (ref 0–0.5)
EOSINOPHIL NFR BLD AUTO: 1.1 % — SIGNIFICANT CHANGE UP (ref 0–6)
GLUCOSE SERPL-MCNC: 96 MG/DL — SIGNIFICANT CHANGE UP (ref 70–99)
GLUCOSE UR QL: NEGATIVE — SIGNIFICANT CHANGE UP
HCT VFR BLD CALC: 33.4 % — LOW (ref 34.5–45)
HGB BLD-MCNC: 10.1 G/DL — LOW (ref 11.5–15.5)
IMM GRANULOCYTES NFR BLD AUTO: 0.3 % — SIGNIFICANT CHANGE UP (ref 0–1.5)
KETONES UR-MCNC: NEGATIVE — SIGNIFICANT CHANGE UP
LEUKOCYTE ESTERASE UR-ACNC: NEGATIVE — SIGNIFICANT CHANGE UP
LIDOCAIN IGE QN: 47 U/L — SIGNIFICANT CHANGE UP (ref 7–60)
LYMPHOCYTES # BLD AUTO: 1.73 K/UL — SIGNIFICANT CHANGE UP (ref 1–3.3)
LYMPHOCYTES # BLD AUTO: 24.7 % — SIGNIFICANT CHANGE UP (ref 13–44)
MCHC RBC-ENTMCNC: 22.6 PG — LOW (ref 27–34)
MCHC RBC-ENTMCNC: 30.2 GM/DL — LOW (ref 32–36)
MCV RBC AUTO: 74.7 FL — LOW (ref 80–100)
MONOCYTES # BLD AUTO: 0.58 K/UL — SIGNIFICANT CHANGE UP (ref 0–0.9)
MONOCYTES NFR BLD AUTO: 8.3 % — SIGNIFICANT CHANGE UP (ref 2–14)
NEUTROPHILS # BLD AUTO: 4.57 K/UL — SIGNIFICANT CHANGE UP (ref 1.8–7.4)
NEUTROPHILS NFR BLD AUTO: 65.2 % — SIGNIFICANT CHANGE UP (ref 43–77)
NITRITE UR-MCNC: NEGATIVE — SIGNIFICANT CHANGE UP
NRBC # BLD: 0 /100 WBCS — SIGNIFICANT CHANGE UP (ref 0–0)
PH UR: 6.5 — SIGNIFICANT CHANGE UP (ref 5–8)
PLATELET # BLD AUTO: 294 K/UL — SIGNIFICANT CHANGE UP (ref 150–400)
POTASSIUM SERPL-MCNC: 3.8 MMOL/L — SIGNIFICANT CHANGE UP (ref 3.5–5.3)
POTASSIUM SERPL-SCNC: 3.8 MMOL/L — SIGNIFICANT CHANGE UP (ref 3.5–5.3)
PROT SERPL-MCNC: 7.7 G/DL — SIGNIFICANT CHANGE UP (ref 6–8.3)
PROT UR-MCNC: NEGATIVE MG/DL — SIGNIFICANT CHANGE UP
RBC # BLD: 4.47 M/UL — SIGNIFICANT CHANGE UP (ref 3.8–5.2)
RBC # FLD: 16.6 % — HIGH (ref 10.3–14.5)
SODIUM SERPL-SCNC: 141 MMOL/L — SIGNIFICANT CHANGE UP (ref 135–145)
SP GR SPEC: <=1.005 — SIGNIFICANT CHANGE UP (ref 1–1.03)
UROBILINOGEN FLD QL: 0.2 E.U./DL — SIGNIFICANT CHANGE UP
WBC # BLD: 7.01 K/UL — SIGNIFICANT CHANGE UP (ref 3.8–10.5)
WBC # FLD AUTO: 7.01 K/UL — SIGNIFICANT CHANGE UP (ref 3.8–10.5)

## 2019-07-11 PROCEDURE — 36415 COLL VENOUS BLD VENIPUNCTURE: CPT

## 2019-07-11 PROCEDURE — 96374 THER/PROPH/DIAG INJ IV PUSH: CPT

## 2019-07-11 PROCEDURE — 81001 URINALYSIS AUTO W/SCOPE: CPT

## 2019-07-11 PROCEDURE — 99284 EMERGENCY DEPT VISIT MOD MDM: CPT | Mod: 25

## 2019-07-11 PROCEDURE — 74176 CT ABD & PELVIS W/O CONTRAST: CPT

## 2019-07-11 PROCEDURE — 85025 COMPLETE CBC W/AUTO DIFF WBC: CPT

## 2019-07-11 PROCEDURE — 74176 CT ABD & PELVIS W/O CONTRAST: CPT | Mod: 26

## 2019-07-11 PROCEDURE — 99284 EMERGENCY DEPT VISIT MOD MDM: CPT

## 2019-07-11 PROCEDURE — 83690 ASSAY OF LIPASE: CPT

## 2019-07-11 PROCEDURE — 87086 URINE CULTURE/COLONY COUNT: CPT

## 2019-07-11 PROCEDURE — 80053 COMPREHEN METABOLIC PANEL: CPT

## 2019-07-11 RX ORDER — SODIUM CHLORIDE 9 MG/ML
1000 INJECTION INTRAMUSCULAR; INTRAVENOUS; SUBCUTANEOUS ONCE
Refills: 0 | Status: COMPLETED | OUTPATIENT
Start: 2019-07-11 | End: 2019-07-11

## 2019-07-11 RX ORDER — KETOROLAC TROMETHAMINE 30 MG/ML
30 SYRINGE (ML) INJECTION ONCE
Refills: 0 | Status: DISCONTINUED | OUTPATIENT
Start: 2019-07-11 | End: 2019-07-11

## 2019-07-11 RX ADMIN — Medication 30 MILLIGRAM(S): at 18:58

## 2019-07-11 RX ADMIN — SODIUM CHLORIDE 1500 MILLILITER(S): 9 INJECTION INTRAMUSCULAR; INTRAVENOUS; SUBCUTANEOUS at 18:58

## 2019-07-11 NOTE — ED PROVIDER NOTE - CARE PROVIDERS DIRECT ADDRESSES
[email or web address]

## 2019-07-11 NOTE — ED PROVIDER NOTE - PHYSICAL EXAMINATION
Mild suprapubic discomfort. No rebound or guarding. R CVA tenderness. No reproducible muscular discomfort. No midline spinal tenderness.

## 2019-07-11 NOTE — ED PROVIDER NOTE - CARE PROVIDER_API CALL
Keaton Doll)  Internal Medicine  [address] [place] [address]  Phone: [phone]  Fax: [phone]  Follow Up Time:     Alejandrina Olson)  Urology  [address] [place] [address]  Phone: [phone]  Fax: [phone]  Follow Up Time:

## 2019-07-11 NOTE — ED PROVIDER NOTE - NSFOLLOWUPINSTRUCTIONS_ED_ALL_ED_FT
EnglishSpanish    Flank Pain, Adult  Flank pain is pain in your side. The flank is the area of your side between your upper belly (abdomen) and your back. The pain may occur over a short time (acute), or it may be long-term or come back often (chronic). It may be mild or very bad. Pain in this area can be caused by many different things.    Follow these instructions at home:  Image   Drink enough fluid to keep your pee (urine) clear or pale yellow.  Rest as told by your doctor.  Take over-the-counter and prescription medicines only as told by your doctor.  Keep a journal to keep track of:  What has caused your flank pain.  What has made it feel better.  Keep all follow-up visits as told by your doctor. This is important.  Contact a doctor if:  Medicine does not help your pain.  You have new symptoms.  Your pain gets worse.  You have a fever.  Your symptoms last longer than 2–3 days.  You have trouble peeing.  You are peeing more often than normal.  Get help right away if:  You have trouble breathing.  You are short of breath.  Your belly hurts, or it is swollen or red.  You feel sick to your stomach (nauseous).  You throw up (vomit).  You feel like you will pass out, or you do pass out (faint).  You have blood in your pee.  Summary  Flank pain is pain in your side. The flank is the area of your side between your upper belly (abdomen) and your back.  Flank pain may occur over a short time (acute), or it may be long-term or come back often (chronic). It may be mild or very bad.  Pain in this area can be caused by many different things.  Contact your doctor if your symptoms get worse or they last longer than 2–3 days.  This information is not intended to replace advice given to you by your health care provider. Make sure you discuss any questions you have with your health care provider.    Document Released: 09/26/2009 Document Revised: 04/09/2018 Document Reviewed: 04/09/2018  [employer] Interactive Patient Education © 2019 [employer]

## 2019-07-11 NOTE — ED PROVIDER NOTE - CHPI ED SYMPTOMS NEG
no urinary urgency, no vaginal discharge, no pelvic pain/no dysuria/no fever/no hematuria/no nausea/no vomiting

## 2019-07-11 NOTE — ED ADULT TRIAGE NOTE - CHIEF COMPLAINT QUOTE
Pt CO Urinary Frequency x2 weeks with pelvic pain now radiating to Left flank.  Pt states "At first I was started on Cipro, but I didn't feel better so I was switched to Ampicillin but I'm still not feeling better."  PT denies N/V/D, SOB, Fevers, CP and Dizziness.

## 2019-07-11 NOTE — ED ADULT NURSE NOTE - OBJECTIVE STATEMENT
Pt presents reporting continued urinary symptoms following Pt presents reporting continued urinary symptoms following 2 course of abx for a UTI. Pt reports urinary frequency Pt presents reporting continued urinary symptoms following 2 course of abx for a UTI. Pt reports urinary frequency, and right flank pain. Pt reports no hx of fevers/chills, no N/V/D. Pt reports that with her first course of antibiotics she had difficulty with remembering to take every dose but states that with her second course of abx she had no issues with medication compliance. Pt reports no pertinent pmh.

## 2019-07-11 NOTE — ED PROVIDER NOTE - OBJECTIVE STATEMENT
28 y/o F with no significant PMHx presents to the ED with complaints of urinary frequency, suprapubic discomfort and R sided flank pain. Patient reports 12 days ago was treated with a 7 day course of Ciprofloxacin for positive UTI, however was asymptomatic at that time. Patient since developed symptoms after completing course of Cipro. Following completion of antibiotics, patient was seen by GYN and started on new course of antibiotics (unsure, however in PCN family) which she took for 5 days, however patient notes persistent symptoms. Denies nausea, vomiting, fever, hematuria, dysuria, urinary urgency, vaginal discharge, pelvic pain, history of kidney disease/stones or any other acute complaints.

## 2019-07-12 PROBLEM — Z00.00 ENCOUNTER FOR PREVENTIVE HEALTH EXAMINATION: Status: ACTIVE | Noted: 2019-07-12

## 2019-07-12 LAB
CULTURE RESULTS: SIGNIFICANT CHANGE UP
SPECIMEN SOURCE: SIGNIFICANT CHANGE UP

## 2019-07-17 ENCOUNTER — FORM ENCOUNTER (OUTPATIENT)
Age: 29
End: 2019-07-17

## 2019-07-18 ENCOUNTER — OUTPATIENT (OUTPATIENT)
Dept: OUTPATIENT SERVICES | Facility: HOSPITAL | Age: 29
LOS: 1 days | End: 2019-07-18
Payer: COMMERCIAL

## 2019-07-18 ENCOUNTER — APPOINTMENT (OUTPATIENT)
Dept: UROLOGY | Facility: CLINIC | Age: 29
End: 2019-07-18
Payer: COMMERCIAL

## 2019-07-18 VITALS — SYSTOLIC BLOOD PRESSURE: 130 MMHG | HEART RATE: 99 BPM | DIASTOLIC BLOOD PRESSURE: 86 MMHG | TEMPERATURE: 97.8 F

## 2019-07-18 DIAGNOSIS — N20.0 CALCULUS OF KIDNEY: ICD-10-CM

## 2019-07-18 PROCEDURE — 71046 X-RAY EXAM CHEST 2 VIEWS: CPT

## 2019-07-18 PROCEDURE — 99204 OFFICE O/P NEW MOD 45 MIN: CPT | Mod: 57

## 2019-07-18 PROCEDURE — 71046 X-RAY EXAM CHEST 2 VIEWS: CPT | Mod: 26

## 2019-07-18 NOTE — HISTORY OF PRESENT ILLNESS
[FreeTextEntry1] : 29F presenting with recurrent/persistent UTI treated with abx x 2. has had multiple positive urine cutlrues. no pain. no fevers chill. no nausea vomtint. +dysuria. +pain. seen in ER with right flank pain. no further abx given. no hematurai. persistent positive culture noted over the last 2-3 months (seen in system) despite report of culture sensitive abx. no family history porstate kidney baldder cancer. infections began this year. \par no symptoms x years at this time.

## 2019-07-18 NOTE — PHYSICAL EXAM
[General Appearance - Well Developed] : well developed [General Appearance - Well Nourished] : well nourished [Normal Appearance] : normal appearance [Well Groomed] : well groomed [Heart Rate And Rhythm] : Heart rate and rhythm were normal [] : no respiratory distress [Abdomen Soft] : soft [Abdomen Tenderness] : non-tender [Abdomen Hernia] : no hernia was discovered [Costovertebral Angle Tenderness] : no ~M costovertebral angle tenderness [Urinary Bladder Findings] : the bladder was normal on palpation [Normal Station and Gait] : the gait and station were normal for the patient's age [Skin Color & Pigmentation] : normal skin color and pigmentation [No Focal Deficits] : no focal deficits [Oriented To Time, Place, And Person] : oriented to person, place, and time [No Palpable Adenopathy] : no palpable adenopathy

## 2019-07-18 NOTE — ASSESSMENT
[FreeTextEntry1] : persistent UTI\par +urine culture with increasing resistance\par small asymptomatic stone\par We spoke at length today about the kidney stone.  Options, including medical expulsive therapy, ESWL, ureteroscopic stone extraction and PCNL were discussed at length. Given the stone  location, ureteroscopy is likely to provide the highest stone free rates. Risks - including an inability to definitively treat the stone, infections including urosepsis, ureteral stricture and injury, gross hematuria, need for ulloa catheterization, post op pain were all discussed. The patient very clearly understands the need for a temporary internal ureteral stent which MUST be removed or changed within 3 months of placement. Would like to proceed. Will schedule.\par if cutlures normalize will cancel\par needs preop abx\par

## 2019-07-22 LAB — BACTERIA UR CULT: ABNORMAL

## 2019-07-25 ENCOUNTER — RESULT REVIEW (OUTPATIENT)
Age: 29
End: 2019-07-25

## 2019-07-25 DIAGNOSIS — Z87.440 PERSONAL HISTORY OF URINARY (TRACT) INFECTIONS: ICD-10-CM

## 2019-07-25 RX ORDER — CEFDINIR 300 MG/1
300 CAPSULE ORAL TWICE DAILY
Qty: 14 | Refills: 0 | Status: ACTIVE | COMMUNITY
Start: 2019-07-25 | End: 1900-01-01

## 2019-07-25 RX ORDER — NITROFURANTOIN (MONOHYDRATE/MACROCRYSTALS) 25; 75 MG/1; MG/1
100 CAPSULE ORAL TWICE DAILY
Qty: 14 | Refills: 0 | Status: ACTIVE | COMMUNITY
Start: 2019-07-25 | End: 1900-01-01

## 2019-07-29 LAB
ANION GAP SERPL CALC-SCNC: 10 MMOL/L
APPEARANCE: CLEAR
APTT BLD: 31.9 SEC
BACTERIA: ABNORMAL
BASOPHILS # BLD AUTO: 0.03 K/UL
BASOPHILS NFR BLD AUTO: 0.5 %
BILIRUBIN URINE: NEGATIVE
BLOOD URINE: ABNORMAL
BUN SERPL-MCNC: 7 MG/DL
CALCIUM SERPL-MCNC: 9.2 MG/DL
CHLORIDE SERPL-SCNC: 105 MMOL/L
CO2 SERPL-SCNC: 24 MMOL/L
COLOR: NORMAL
CREAT SERPL-MCNC: 0.62 MG/DL
EOSINOPHIL # BLD AUTO: 0.07 K/UL
EOSINOPHIL NFR BLD AUTO: 1.3 %
GLUCOSE QUALITATIVE U: NEGATIVE
GLUCOSE SERPL-MCNC: 102 MG/DL
HCT VFR BLD CALC: 34.6 %
HGB BLD-MCNC: 10.6 G/DL
HYALINE CASTS: 0 /LPF
IMM GRANULOCYTES NFR BLD AUTO: 0.4 %
INR PPP: 1.02 RATIO
KETONES URINE: NEGATIVE
LEUKOCYTE ESTERASE URINE: NEGATIVE
LYMPHOCYTES # BLD AUTO: 1.33 K/UL
LYMPHOCYTES NFR BLD AUTO: 24.1 %
MAN DIFF?: NORMAL
MCHC RBC-ENTMCNC: 22.9 PG
MCHC RBC-ENTMCNC: 30.6 GM/DL
MCV RBC AUTO: 74.7 FL
MICROSCOPIC-UA: NORMAL
MONOCYTES # BLD AUTO: 0.43 K/UL
MONOCYTES NFR BLD AUTO: 7.8 %
NEUTROPHILS # BLD AUTO: 3.63 K/UL
NEUTROPHILS NFR BLD AUTO: 65.9 %
NITRITE URINE: NEGATIVE
PH URINE: 6.5
PLATELET # BLD AUTO: 361 K/UL
POTASSIUM SERPL-SCNC: 3.9 MMOL/L
PROTEIN URINE: NEGATIVE
PT BLD: 11.5 SEC
RBC # BLD: 4.63 M/UL
RBC # FLD: 17 %
RED BLOOD CELLS URINE: 98 /HPF
SODIUM SERPL-SCNC: 139 MMOL/L
SPECIFIC GRAVITY URINE: 1.01
SQUAMOUS EPITHELIAL CELLS: 1 /HPF
UROBILINOGEN URINE: NORMAL
WBC # FLD AUTO: 5.51 K/UL
WHITE BLOOD CELLS URINE: 2 /HPF

## 2019-12-30 ENCOUNTER — RESULT REVIEW (OUTPATIENT)
Age: 29
End: 2019-12-30

## 2020-12-21 PROBLEM — Z87.440 HISTORY OF URINARY TRACT INFECTION: Status: RESOLVED | Noted: 2019-07-25 | Resolved: 2020-12-21

## 2021-06-10 NOTE — ED ADULT NURSE NOTE - URINE CHARACTERISTICS
Health Maintenance Due   Topic Date Due   • COLORECTAL CANCER SCREENING  Never done   • COVID-19 Vaccine (1) Never done   • ANNUAL PHYSICAL  06/10/2021     Start PT.  
clear

## 2025-02-20 NOTE — ED ADULT NURSE NOTE - CAS TRG GENERAL NORM CIRC DET
Left voicemail for patient, requested  callback  to confirm understanding of dose adjustment. Prescription routed to provider to go to pharmacy, increase sirolimus to 2 mg daily. My [facility] message sent as well.  ----- Message from Kesha Brandon DO sent at 2/20/2025  8:41 AM CST -----  Increase sirolimus to 2mg daily.  Did she decide to go with [facility] for transplant??  ----- Message -----  From: Kaylen Simon  Sent: 2/19/2025   4:43 PM CST  To: Kesha Brandon DO    1 mg daily.  ----- Message -----  From: Kesha Brandon DO  Sent: 2/19/2025   9:24 AM CST  To: Petty Andersen RN    What's her current dose?  ----- Message -----  From: Petty Andersen RN  Sent: 2/18/2025   8:40 PM CST  To: Kesha Brandon, DO    Any changes to sirolimus?  
Strong peripheral pulses